# Patient Record
Sex: FEMALE | Race: WHITE | Employment: UNEMPLOYED | URBAN - METROPOLITAN AREA
[De-identification: names, ages, dates, MRNs, and addresses within clinical notes are randomized per-mention and may not be internally consistent; named-entity substitution may affect disease eponyms.]

---

## 2018-09-14 ENCOUNTER — OFFICE VISIT (OUTPATIENT)
Dept: URGENT CARE | Age: 53
End: 2018-09-14

## 2018-09-14 VITALS
TEMPERATURE: 97.7 F | SYSTOLIC BLOOD PRESSURE: 126 MMHG | DIASTOLIC BLOOD PRESSURE: 60 MMHG | HEART RATE: 84 BPM | OXYGEN SATURATION: 97 % | HEIGHT: 67 IN | WEIGHT: 183 LBS | BODY MASS INDEX: 28.72 KG/M2 | RESPIRATION RATE: 16 BRPM

## 2018-09-14 DIAGNOSIS — L02.411 ABSCESS OF AXILLA, RIGHT: ICD-10-CM

## 2018-09-14 DIAGNOSIS — L02.91 ABSCESS: Primary | ICD-10-CM

## 2018-09-14 RX ORDER — NAPROXEN 500 MG/1
500 TABLET ORAL 2 TIMES DAILY WITH MEALS
Qty: 30 TAB | Refills: 0 | Status: SHIPPED | OUTPATIENT
Start: 2018-09-14 | End: 2022-05-06 | Stop reason: CLARIF

## 2018-09-14 RX ORDER — DOXYCYCLINE 100 MG/1
100 TABLET ORAL 2 TIMES DAILY
Qty: 20 TAB | Refills: 0 | Status: SHIPPED | OUTPATIENT
Start: 2018-09-14 | End: 2018-09-24

## 2018-09-14 NOTE — MR AVS SNAPSHOT
[address] [address] 
[phone] Patient: Alex Mercer MRN: [id number] :1965 Visit Information Date & Time Provider Department Dept. Phone Encounter #  
 2018 10:45 AM [address] [facility] [phone] [id number] Upcoming Health Maintenance Date Due Pneumococcal 19-64 Medium Risk (1 of 1 - PPSV23) 1986 DTaP/Tdap/Td series (1 - Tdap) 2015 BREAST CANCER SCRN MAMMOGRAM 2017 FOBT Q 1 YEAR AGE 50-75 2017 PAP AKA CERVICAL CYTOLOGY 2018 Influenza Age 5 to Adult 2018 Allergies as of 2018  Review Complete On: 2018 By: Sanchez Rubio MD  
 No Known Allergies Current Immunizations  Reviewed on 2015 Name Date Td, Adsorbed PF 7/15/2015 Not reviewed this visit You Were Diagnosed With   
  
 Codes Comments Abscess    -  Primary ICD-10-CM: L02.91 
ICD-9-CM: 026. 9 Abscess of axilla, right     ICD-10-CM: L02.411 ICD-9-CM: 377. 3 Vitals BP Pulse Temp Resp Height(growth percentile) Weight(growth percentile) 126/60 84 97.7 °F (36.5 °C) 16 5' 7\" (1.702 m) 183 lb (83 kg) SpO2 BMI OB Status Smoking Status 97% 28.66 kg/m2 Menopause Former Smoker BMI and BSA Data Body Mass Index Body Surface Area  
 28.66 kg/m 2 1.98 m 2 Preferred Pharmacy Pharmacy Name Phone [address] [place] [address] [phone] Your Updated Medication List  
  
   
This list is accurate as of 18 12:07 PM.  Always use your most recent med list.  
  
  
  
  
 B COMPLEX 1 tablet Generic drug:  b complex vitamins Take 1 Tab by mouth daily. doxycycline 100 mg tablet Commonly known as:  ADOXA Take 1 Tab by mouth two (2) times a day for 10 days. multivitamin tablet Commonly known as:  ONE A DAY  
 Take 1 Tab by mouth daily. naproxen 500 mg tablet Commonly known as:  NAPROSYN Take 1 Tab by mouth two (2) times daily (with meals). Prescriptions Sent to Pharmacy Refills  
 naproxen (NAPROSYN) 500 mg tablet 0 Sig: Take 1 Tab by mouth two (2) times daily (with meals). Class: Normal  
 Pharmacy: [facility] [address], [place] Ph #: [phone] Route: Oral  
 doxycycline (ADOXA) 100 mg tablet 0 Sig: Take 1 Tab by mouth two (2) times a day for 10 days. Class: Normal  
 Pharmacy: [facility] [address], [place] Ph #: [phone] Route: Oral  
  
We Performed the Following I&D ABCESS COMP/MULTIPLE [id number] Custom] Comments: This order was created via procedure documentation Patient Instructions Skin Abscess: Care Instructions Your Care Instructions A skin abscess is a bacterial infection that forms a pocket of pus. A boil is a kind of skin abscess. The doctor may have cut an opening in the abscess so that the pus can drain out. You may have gauze in the cut so that the abscess will stay open and keep draining. You may need antibiotics. You will need to follow up with your doctor to make sure the infection has gone away. The doctor has checked you carefully, but problems can develop later. If you notice any problems or new symptoms, get medical treatment right away. Follow-up care is a key part of your treatment and safety. Be sure to make and go to all appointments, and call your doctor if you are having problems. It's also a good idea to know your test results and keep a list of the medicines you take. How can you care for yourself at home? · Apply warm and dry compresses, a heating pad set on low, or a hot water bottle 3 or 4 times a day for pain. Keep a cloth between the heat source and your skin. · If your doctor prescribed antibiotics, take them as directed. Do not stop taking them just because you feel better. You need to take the full course of antibiotics. · Take pain medicines exactly as directed. ¨ If the doctor gave you a prescription medicine for pain, take it as prescribed. ¨ If you are not taking a prescription pain medicine, ask your doctor if you can take an over-the-counter medicine. · Keep your bandage clean and dry. Change the bandage whenever it gets wet or dirty, or at least one time a day. · If the abscess was packed with gauze: 
¨ Keep follow-up appointments to have the gauze changed or removed. If the doctor instructed you to remove the gauze, gently pull out all of the gauze when your doctor tells you to. ¨ After the gauze is removed, soak the area in warm water for 15 to 20 minutes 2 times a day, until the wound closes. When should you call for help? Call your doctor now or seek immediate medical care if: 
  · You have signs of worsening infection, such as: 
¨ Increased pain, swelling, warmth, or redness. ¨ Red streaks leading from the infected skin. ¨ Pus draining from the wound. ¨ A fever.  
 Watch closely for changes in your health, and be sure to contact your doctor if: 
  · You do not get better as expected. Where can you learn more? Go to http://rosaline-jaime.info/. Enter H009 in the search box to learn more about \"Skin Abscess: Care Instructions. \" Current as of: May 10, 2017 Content Version: 11.7 © 2652-7044 [employer]wise[employer]. Care instructions adapted under license by [employer] (which disclaims liability or warranty for this information). If you have questions about a medical condition or this instruction, always ask your healthcare professional. [place] [address] any warranty or liability for your use of this information. Hidradenitis Suppurativa: Care Instructions Your Care Instructions Hidradenitis suppurativa (say \"sgt-kmyg-wm-NY-tus sup-yur-uh-TY-vuh\") is a skin condition that causes lumps on the skin that look like pimples or boils. The lumps are usually painful and can break open and drain blood and bad-smelling pus. The condition can come and go for many years. Treatment for this condition may includeantibiotics and other medicines. You may need surgeryto remove the lumps. Home care includes wearing loose-fitting clothes and washing the area gently. You can help prevent lumps from coming back by staying at a healthy weight and not smoking. Doctors don't know exactly how this condition starts. But they do know that something irritates and inflames the hair follicles, causing them to swell and form lumps. This skin condition can't be spread from person to person (isn't contagious). Follow-up care is a key part of your treatment and safety. Be sure to make and go to all appointments, and call your doctor if you are having problems. It's also a good idea to know your test results and keep a list of the medicines you take. How can you care for yourself at home? 
[facility] care 
  · Wash the area every day with mild soap. Use your hands rather than a washcloth or sponge when you wash that part of your body.  
  · Leave the affected areas uncovered when you can. If you have lumps that are draining, you can cover them with a bandage or other dressing. Put petroleum jelly (such as Vaseline) on the dressing to help keep it from sticking.  
  · Wear-loose fitting clothes that don't rub against the area. Avoid activities that cause skin to rub together.  
  · If you have pain, try a warm compress. Soak a towel or washcloth in warm water, wring it out, and place it on the affected skin for about 10 minutes. Medicines 
  · Be safe with medicines. Take your medicines exactly as prescribed. Call your doctor if you think you are having a problem with your medicine. You will get more details on the specific medicines your doctor prescribes.  
  · If your doctor prescribed antibiotics, take them as directed. Do not stop taking them just because you feel better. You need to take the full course of antibiotics.  
 Lifestyle choices 
  · If you smoke, think about quitting. Smoking can make the condition worse. If you need help quitting, talk to your doctor about stop-smoking programs and medicines. These can increase your chances of quitting for good.  
  · Stay at a healthy weight, or lose weight, by eating healthy foods and being physically active. Being overweight could make this condition worse. When should you call for help? Call your doctor now or seek immediate medical care if: 
  · You have symptoms of infection, such as: 
¨ Increased pain, swelling, warmth, or redness. ¨ Red streaks leading from the area. ¨ Pus draining from the area. ¨ A fever.  
 Watch closely for changes in your health, and be sure to contact your doctor if: 
  · You do not get better as expected. Where can you learn more? Go to http://rosaline-jaime.info/. Enter D983 in the search box to learn more about \"Hidradenitis Suppurativa: Care Instructions. \" Current as of: October 5, 2017 Content Version: 11.7 © 6684-3236 [employer]. Care instructions adapted under license by [facility] (which disclaims liability or warranty for this information). If you have questions about a medical condition or this instruction, always ask your healthcare professional. [place] [address] any warranty or liability for your use of this information. Keep packing in place, remove in 3 days, warms compresses daily, follow up pcp in 3 days, antibiotics as prescribed Introducing [facility]! Dear Day Serna: 
Thank you for requesting a [employer] account. Our records indicate that you already have an active [employer] account.   You can access your account anytime at [email or web address] Did you know that you can access your hospital and ER discharge instructions at any time in [email or web address]? You can also review all of your test results from your hospital stay or ER visit. Additional Information If you have questions, please visit the Frequently Asked Questions section of the [email or web address] website at [email or web address] Remember, [email or web address] is NOT to be used for urgent needs. For medical emergencies, dial 911. Now available from your iPhone and Android! Please provide this summary of care documentation to your next provider. Your primary care clinician is listed as Fabi Grant. If you have any questions after today's visit, please call [phone].

## 2018-09-14 NOTE — PROGRESS NOTES
HPI Comments: History of abscess right axilla, incised and drained 5 years ago, increasing pain, redness, drainage over last week Patient is a 50 y.o. female presenting with abscess. The history is provided by the patient. Abscess This is a recurrent problem. The current episode started more than 1 week ago. The problem occurs constantly. The problem has not changed since onset. Pertinent negatives include no chest pain, no abdominal pain, no headaches and no shortness of breath. Nothing aggravates the symptoms. Relieved by: hot compresses. She has tried a warm compress for the symptoms. The treatment provided mild relief. Past Medical History:  
Diagnosis Date  Alcohol abuse  Boil 9/11/2014  
 right axilla  Seizures ([address] 5/2015  
 2 x Past Surgical History:  
Procedure Laterality Date  HX CYST INCISION AND DRAINAGE Right 9/2014  
 complex I&D  HX ORTHOPAEDIC    
 right wrist  
   
 
Family History Problem Relation Age of Onset  COPD Father  Hypertension Mother Social History Social History  Marital status:  Spouse name: N/A  
 Number of children: N/A  
 Years of education: N/A Occupational History  Not on file. Social History Main Topics  Smoking status: Former Smoker Packs/day: 0.50  Smokeless tobacco: Current User  Alcohol use Yes Comment: at least a 5th a day. States not drinking for the 1 year  Drug use: No  
 Sexual activity: Yes  
  Partners: Male Birth control/ protection: None Other Topics Concern  Not on file Social History Narrative [employer] Lives in [place], travels with  and his job- on contract, previous [employer] 2 adult sons, 24 and 24 yo. ALLERGIES: Review of patient's allergies indicates no known allergies. Review of Systems Constitutional: Negative for fever. HENT: Negative for rhinorrhea. Respiratory: Negative for shortness of breath. Cardiovascular: Negative for chest pain. Gastrointestinal: Negative for abdominal pain, nausea and vomiting. Musculoskeletal: Negative for myalgias. Skin: Positive for wound. Neurological: Negative for headaches. All other systems reviewed and are negative. Vitals:  
 09/14/18 1106 BP: 126/60 Pulse: 84 Resp: 16 Temp: 97.7 °F (36.5 °C) SpO2: 97% Weight: 183 lb (83 kg) Height: 5' 7\" (1.702 m) Physical Exam  
Constitutional: She is oriented to person, place, and time. She appears well-developed and well-nourished. No distress. HENT:  
Head: Normocephalic and atraumatic. Eyes: Conjunctivae and EOM are normal. Pupils are equal, round, and reactive to light. Cardiovascular: Intact distal pulses. Musculoskeletal: Normal range of motion. She exhibits no edema, tenderness or deformity. Neurological: She is alert and oriented to person, place, and time. No cranial nerve deficit. She exhibits normal muscle tone. Skin: Skin is warm and dry. She is not diaphoretic. There is erythema. Right axilla approx 4 cm ellipitical fluctuant lesion with surrounding erythema, small pustule in center with tenderness to palpation, Psychiatric: She has a normal mood and affect. Her behavior is normal. Judgment and thought content normal.  
Nursing note and vitals reviewed. MDM Differential Diagnosis; Clinical Impression; Plan: Abscess right axilla I&D Abcess Complex Consent: Verbal consent obtained. Written consent not obtained. Consent given by: patient Date/Time: 9/14/2018 12:02 PM 
Performed by: attendingPreparation: skin prepped with Betadine Pre-procedure re-eval: Immediately prior to the procedure, the patient was reevaluated and found suitable for the planned procedure and any planned medications.  
Time out: Immediately prior to the procedure a time out was called to verify the correct patient, procedure, equipment, staff and marking as appropriate. Shelia Harding Location details: right [place] Anesthesia: local infiltration Anesthesia: 
Local Anesthetic: lidocaine 1% with epinephrine Anesthetic total: 3 mL Scalpel size: 11 Incision type: single straight Complexity: simple Drainage: purulent Drainage amount: moderate Wound treatment: drain placed and  expression of material 
Packing material: 1/4 in iodoform gauze Post-procedure: dressing applied Patient tolerance: Patient tolerated the procedure well with no immediate complications My total time at bedside, performing this procedure was 1-15 minutes.

## 2018-09-14 NOTE — PATIENT INSTRUCTIONS
Skin Abscess: Care Instructions Your Care Instructions A skin abscess is a bacterial infection that forms a pocket of pus. A boil is a kind of skin abscess. The doctor may have cut an opening in the abscess so that the pus can drain out. You may have gauze in the cut so that the abscess will stay open and keep draining. You may need antibiotics. You will need to follow up with your doctor to make sure the infection has gone away. The doctor has checked you carefully, but problems can develop later. If you notice any problems or new symptoms, get medical treatment right away. Follow-up care is a key part of your treatment and safety. Be sure to make and go to all appointments, and call your doctor if you are having problems. It's also a good idea to know your test results and keep a list of the medicines you take. How can you care for yourself at home? · Apply warm and dry compresses, a heating pad set on low, or a hot water bottle 3 or 4 times a day for pain. Keep a cloth between the heat source and your skin. · If your doctor prescribed antibiotics, take them as directed. Do not stop taking them just because you feel better. You need to take the full course of antibiotics. · Take pain medicines exactly as directed. ¨ If the doctor gave you a prescription medicine for pain, take it as prescribed. ¨ If you are not taking a prescription pain medicine, ask your doctor if you can take an over-the-counter medicine. · Keep your bandage clean and dry. Change the bandage whenever it gets wet or dirty, or at least one time a day. · If the abscess was packed with gauze: 
¨ Keep follow-up appointments to have the gauze changed or removed. If the doctor instructed you to remove the gauze, gently pull out all of the gauze when your doctor tells you to. ¨ After the gauze is removed, soak the area in warm water for 15 to 20 minutes 2 times a day, until the wound closes. When should you call for help? Call your doctor now or seek immediate medical care if: 
  · You have signs of worsening infection, such as: 
¨ Increased pain, swelling, warmth, or redness. ¨ Red streaks leading from the infected skin. ¨ Pus draining from the wound. ¨ A fever.  
 Watch closely for changes in your health, and be sure to contact your doctor if: 
  · You do not get better as expected. Where can you learn more? Go to http://rosaline-jaime.info/. Enter U502 in the search box to learn more about \"Skin Abscess: Care Instructions. \" Current as of: May 10, 2017 Content Version: 11.7 © 4964-0857 [employer]. Care instructions adapted under license by [employer] (which disclaims liability or warranty for this information). If you have questions about a medical condition or this instruction, always ask your healthcare professional. [address] any warranty or liability for your use of this information. Hidradenitis Suppurativa: Care Instructions Your Care Instructions Hidradenitis suppurativa (say \"zpp-vohs-jo-NY-tus sup-yur-uh-TY-vuh\") is a skin condition that causes lumps on the skin that look like pimples or boils. The lumps are usually painful and can break open and drain blood and bad-smelling pus. The condition can come and go for many years. Treatment for this condition may includeantibiotics and other medicines. You may need surgeryto remove the lumps. Home care includes wearing loose-fitting clothes and washing the area gently. You can help prevent lumps from coming back by staying at a healthy weight and not smoking. Doctors don't know exactly how this condition starts. But they do know that something irritates and inflames the hair follicles, causing them to swell and form lumps. This skin condition can't be spread from person to person (isn't contagious). Follow-up care is a key part of your treatment and safety.  Be sure to make and go to all appointments, and call your doctor if you are having problems. It's also a good idea to know your test results and keep a list of the medicines you take. How can you care for yourself at home? 
[facility] care 
  · Wash the area every day with mild soap. Use your hands rather than a washcloth or sponge when you wash that part of your body.  
  · Leave the affected areas uncovered when you can. If you have lumps that are draining, you can cover them with a bandage or other dressing. Put petroleum jelly (such as Vaseline) on the dressing to help keep it from sticking.  
  · Wear-loose fitting clothes that don't rub against the area. Avoid activities that cause skin to rub together.  
  · If you have pain, try a warm compress. Soak a towel or washcloth in warm water, wring it out, and place it on the affected skin for about 10 minutes. Medicines 
  · Be safe with medicines. Take your medicines exactly as prescribed. Call your doctor if you think you are having a problem with your medicine. You will get more details on the specific medicines your doctor prescribes.  
  · If your doctor prescribed antibiotics, take them as directed. Do not stop taking them just because you feel better. You need to take the full course of antibiotics.  
 Lifestyle choices 
  · If you smoke, think about quitting. Smoking can make the condition worse. If you need help quitting, talk to your doctor about stop-smoking programs and medicines. These can increase your chances of quitting for good.  
  · Stay at a healthy weight, or lose weight, by eating healthy foods and being physically active. Being overweight could make this condition worse. When should you call for help? Call your doctor now or seek immediate medical care if: 
  · You have symptoms of infection, such as: 
¨ Increased pain, swelling, warmth, or redness. ¨ Red streaks leading from the area. ¨ Pus draining from the area. ¨ A fever.  Watch closely for changes in your health, and be sure to contact your doctor if: 
  · You do not get better as expected. Where can you learn more? Go to http://rosaline-jaime.info/. Enter N433 in the search box to learn more about \"Hidradenitis Suppurativa: Care Instructions. \" Current as of: October 5, 2017 Content Version: 11.7 © 3275-9659 [employer]. Care instructions adapted under license by [employer] (which disclaims liability or warranty for this information). If you have questions about a medical condition or this instruction, always ask your healthcare professional. [address] any warranty or liability for your use of this information. Keep packing in place, remove in 3 days, warms compresses daily, follow up pcp in 3 days, antibiotics as prescribed

## 2018-09-21 ENCOUNTER — OFFICE VISIT (OUTPATIENT)
Dept: URGENT CARE | Age: 53
End: 2018-09-21

## 2018-09-21 VITALS
WEIGHT: 188.5 LBS | DIASTOLIC BLOOD PRESSURE: 60 MMHG | HEART RATE: 62 BPM | TEMPERATURE: 97.7 F | SYSTOLIC BLOOD PRESSURE: 138 MMHG | RESPIRATION RATE: 16 BRPM | BODY MASS INDEX: 29.58 KG/M2 | OXYGEN SATURATION: 98 % | HEIGHT: 67 IN

## 2018-09-21 DIAGNOSIS — L73.2 HIDRADENITIS SUPPURATIVA OF RIGHT AXILLA: Primary | ICD-10-CM

## 2018-09-21 NOTE — PATIENT INSTRUCTIONS
Hidradenitis Suppurativa: Care Instructions Your Care Instructions Hidradenitis suppurativa (say \"kag-jxea-gz-NY-tus sup-ronak-uh-TY-vuh\") is a skin condition that causes lumps on the skin that look like pimples or boils. The lumps are usually painful and can break open and drain blood and bad-smelling pus. The condition can come and go for many years. Treatment for this condition may includeantibiotics and other medicines. You may need surgeryto remove the lumps. Home care includes wearing loose-fitting clothes and washing the area gently. You can help prevent lumps from coming back by staying at a healthy weight and not smoking. Doctors don't know exactly how this condition starts. But they do know that something irritates and inflames the hair follicles, causing them to swell and form lumps. This skin condition can't be spread from person to person (isn't contagious). Follow-up care is a key part of your treatment and safety. Be sure to make and go to all appointments, and call your doctor if you are having problems. It's also a good idea to know your test results and keep a list of the medicines you take. How can you care for yourself at home? 
[facility] care 
  · Wash the area every day with mild soap. Use your hands rather than a washcloth or sponge when you wash that part of your body.  
  · Leave the affected areas uncovered when you can. If you have lumps that are draining, you can cover them with a bandage or other dressing. Put petroleum jelly (such as Vaseline) on the dressing to help keep it from sticking.  
  · Wear-loose fitting clothes that don't rub against the area. Avoid activities that cause skin to rub together.  
  · If you have pain, try a warm compress. Soak a towel or washcloth in warm water, wring it out, and place it on the affected skin for about 10 minutes. Medicines 
  · Be safe with medicines. Take your medicines exactly as prescribed. Call your doctor if you think you are having a problem with your medicine. You will get more details on the specific medicines your doctor prescribes.  
  · If your doctor prescribed antibiotics, take them as directed. Do not stop taking them just because you feel better. You need to take the full course of antibiotics.  
 Lifestyle choices 
  · If you smoke, think about quitting. Smoking can make the condition worse. If you need help quitting, talk to your doctor about stop-smoking programs and medicines. These can increase your chances of quitting for good.  
  · Stay at a healthy weight, or lose weight, by eating healthy foods and being physically active. Being overweight could make this condition worse. When should you call for help? Call your doctor now or seek immediate medical care if: 
  · You have symptoms of infection, such as: 
¨ Increased pain, swelling, warmth, or redness. ¨ Red streaks leading from the area. ¨ Pus draining from the area. ¨ A fever.  
 Watch closely for changes in your health, and be sure to contact your doctor if: 
  · You do not get better as expected. Where can you learn more? Go to http://rosaline-jaime.info/. Enter R463 in the search box to learn more about \"Hidradenitis Suppurativa: Care Instructions. \" Current as of: October 5, 2017 Content Version: 11.7 © 3064-6294 [employer]. Care instructions adapted under license by [employer] (which disclaims liability or warranty for this information). If you have questions about a medical condition or this instruction, always ask your healthcare professional. [place] [address] any warranty or liability for your use of this information.

## 2018-09-21 NOTE — MR AVS SNAPSHOT
[address] [place] [address] 
[phone] Patient: Janelle Carrillo MRN: [id number] :1965 Visit Information Date & Time Provider Department Dept. Phone Encounter #  
 2018 11:45 AM [address] [facility] [phone] [id number] Follow-up Instructions Return in about 1 week (around 2018), or if symptoms worsen or fail to improve. Upcoming Health Maintenance Date Due Pneumococcal 19-64 Medium Risk (1 of 1 - PPSV23) 1986 DTaP/Tdap/Td series (1 - Tdap) 2015 BREAST CANCER SCRN MAMMOGRAM 2017 FOBT Q 1 YEAR AGE 50-75 2017 PAP AKA CERVICAL CYTOLOGY 2018 Influenza Age 5 to Adult 2018 Allergies as of 2018  Review Complete On: 2018 By: Alber Almazan RN No Known Allergies Current Immunizations  Reviewed on 2015 Name Date Td, Adsorbed PF 7/15/2015 Not reviewed this visit You Were Diagnosed With   
  
 Codes Comments Hidradenitis suppurativa of right axilla    -  Primary ICD-10-CM: L73.2 ICD-9-CM: 705.83 Vitals BP Pulse Temp Resp Height(growth percentile) Weight(growth percentile)  
 138/60 62 97.7 °F (36.5 °C) 16 5' 7\" (1.702 m) 188 lb 8 oz (85.5 kg) SpO2 BMI OB Status Smoking Status 98% 29.52 kg/m2 Menopause Former Smoker BMI and BSA Data Body Mass Index Body Surface Area  
 29.52 kg/m 2 2.01 m 2 Preferred Pharmacy Pharmacy Name Phone [address] [place] [address] [phone] Your Updated Medication List  
  
   
This list is accurate as of 18 12:57 PM.  Always use your most recent med list.  
  
  
  
  
 B COMPLEX 1 tablet Generic drug:  b complex vitamins Take 1 Tab by mouth daily. doxycycline 100 mg tablet Commonly known as:  ADOXA Take 1 Tab by mouth two (2) times a day for 10 days. multivitamin tablet Commonly known as:  ONE A DAY Take 1 Tab by mouth daily. naproxen 500 mg tablet Commonly known as:  NAPROSYN Take 1 Tab by mouth two (2) times daily (with meals). Follow-up Instructions Return in about 1 week (around 9/28/2018), or if symptoms worsen or fail to improve. Patient Instructions Hidradenitis Suppurativa: Care Instructions Your Care Instructions Hidradenitis suppurativa (say \"rqq-vtvf-ly-NY-tus sup-yur-uh-TY-vuh\") is a skin condition that causes lumps on the skin that look like pimples or boils. The lumps are usually painful and can break open and drain blood and bad-smelling pus. The condition can come and go for many years. Treatment for this condition may includeantibiotics and other medicines. You may need surgeryto remove the lumps. Home care includes wearing loose-fitting clothes and washing the area gently. You can help prevent lumps from coming back by staying at a healthy weight and not smoking. Doctors don't know exactly how this condition starts. But they do know that something irritates and inflames the hair follicles, causing them to swell and form lumps. This skin condition can't be spread from person to person (isn't contagious). Follow-up care is a key part of your treatment and safety. Be sure to make and go to all appointments, and call your doctor if you are having problems. It's also a good idea to know your test results and keep a list of the medicines you take. How can you care for yourself at home? 
[facility] care 
  · Wash the area every day with mild soap. Use your hands rather than a washcloth or sponge when you wash that part of your body.  
  · Leave the affected areas uncovered when you can. If you have lumps that are draining, you can cover them with a bandage or other dressing.  Put petroleum jelly (such as Vaseline) on the dressing to help keep it from sticking.  
  · Wear-loose fitting clothes that don't rub against the area. Avoid activities that cause skin to rub together.  
  · If you have pain, try a warm compress. Soak a towel or washcloth in warm water, wring it out, and place it on the affected skin for about 10 minutes. Medicines 
  · Be safe with medicines. Take your medicines exactly as prescribed. Call your doctor if you think you are having a problem with your medicine. You will get more details on the specific medicines your doctor prescribes.  
  · If your doctor prescribed antibiotics, take them as directed. Do not stop taking them just because you feel better. You need to take the full course of antibiotics.  
 Lifestyle choices 
  · If you smoke, think about quitting. Smoking can make the condition worse. If you need help quitting, talk to your doctor about stop-smoking programs and medicines. These can increase your chances of quitting for good.  
  · Stay at a healthy weight, or lose weight, by eating healthy foods and being physically active. Being overweight could make this condition worse. When should you call for help? Call your doctor now or seek immediate medical care if: 
  · You have symptoms of infection, such as: 
¨ Increased pain, swelling, warmth, or redness. ¨ Red streaks leading from the area. ¨ Pus draining from the area. ¨ A fever.  
 Watch closely for changes in your health, and be sure to contact your doctor if: 
  · You do not get better as expected. Where can you learn more? Go to http://rosaline-jaime.info/. Enter G473 in the search box to learn more about \"Hidradenitis Suppurativa: Care Instructions. \" Current as of: October 5, 2017 Content Version: 11.7 © 9336-4540 [employer]. Care instructions adapted under license by [employer] (which disclaims liability or warranty for this information).  If you have questions about a medical condition or this instruction, always ask your healthcare professional. [address] any warranty or liability for your use of this information. Introducing [facility]! Dear Marivel Godfrey: 
Thank you for requesting a [employer] account. Our records indicate that you already have an active [employer] account. You can access your account anytime at [email or web address]. [employer] Did you know that you can access your hospital and ER discharge instructions at any time in [employer]? You can also review all of your test results from your hospital stay or ER visit. Additional Information If you have questions, please visit the Frequently Asked Questions section of the [employer] website at [email or web address]. [employer]/. Remember, [employer] is NOT to be used for urgent needs. For medical emergencies, dial 911. Now available from your iPhone and Android! Please provide this summary of care documentation to your next provider. Your primary care clinician is listed as [employer] If you have any questions after today's visit, please call [phone].

## 2018-09-21 NOTE — PROGRESS NOTES
Patient is a 50 y.o. female presenting with wound check. The history is provided by the patient (Return visit for Hidradentitis Supprativa of right axilla. Pt. worried about drainage. taking doxycycline, done on Sun. No redness, streaking, or bleeding. ). Wound Check This is a chronic problem. The current episode started more than 1 week ago. The problem has not changed since onset. Pertinent negatives include no chest pain and no abdominal pain. The symptoms are aggravated by walking. The symptoms are relieved by rest. She has tried a warm compress for the symptoms. The treatment provided no relief. Past Medical History:  
Diagnosis Date  Alcohol abuse  Boil 9/11/2014  
 right axilla  Seizures ([address] 5/2015  
 2 x Past Surgical History:  
Procedure Laterality Date  HX CYST INCISION AND DRAINAGE Right 9/2014  
 complex I&D  HX ORTHOPAEDIC    
 right wrist  
   
 
Family History Problem Relation Age of Onset  COPD Father  Hypertension Mother Social History Social History  Marital status:  Spouse name: N/A  
 Number of children: N/A  
 Years of education: N/A Occupational History  Not on file. Social History Main Topics  Smoking status: Former Smoker Packs/day: 0.50  Smokeless tobacco: Current User  Alcohol use Yes Comment: at least a 5th a day. States not drinking for the 1 year  Drug use: No  
 Sexual activity: Yes  
  Partners: Male Birth control/ protection: None Other Topics Concern  Not on file Social History Narrative Temple Lives in [place], travels with  and his job- on contract, previous [facility] 2 adult sons, 24 and 24 yo. ALLERGIES: Review of patient's allergies indicates no known allergies. Review of Systems Constitutional: Negative for appetite change and chills. HENT: Negative for hearing loss. Cardiovascular: Negative for chest pain. Gastrointestinal: Negative for abdominal pain. Neurological: Negative for weakness, light-headedness and numbness. Psychiatric/Behavioral: Negative for agitation. All other systems reviewed and are negative. Vitals:  
 09/21/18 1159 BP: 138/60 Pulse: 62 Resp: 16 Temp: 97.7 °F (36.5 °C) SpO2: 98% Weight: 188 lb 8 oz (85.5 kg) Height: 5' 7\" (1.702 m) Physical Exam  
Constitutional: She is oriented to person, place, and time. She appears well-developed and well-nourished. No distress. HENT:  
Head: Normocephalic and atraumatic. Right Ear: External ear normal.  
Left Ear: External ear normal.  
Eyes: Conjunctivae and EOM are normal. Pupils are equal, round, and reactive to light. Neck: Normal range of motion. Neck supple. No tracheal deviation present. Cardiovascular: Normal rate, regular rhythm and normal heart sounds. Exam reveals no gallop. No murmur heard. Pulmonary/Chest: Effort normal and breath sounds normal. She has no wheezes. Abdominal: Soft. Bowel sounds are normal. She exhibits no distension. There is no tenderness. Musculoskeletal: Normal range of motion. She exhibits no edema or tenderness. Neurological: She is alert and oriented to person, place, and time. Skin: Skin is warm and dry. She is not diaphoretic. 1 cm linear incision in distal axilla, with purulent drainage and fluctuance. No erythema, streaking, or bleeding at sight. Psychiatric: She has a normal mood and affect. Her behavior is normal. Judgment and thought content normal.  
Nursing note and vitals reviewed. MDM Differential Diagnosis; Clinical Impression; Plan:  
  Hidradenitis Supprativa of right axilla- continue current regimen, add packing gauze, and bacitracin to skin, stop shaving axilla, f/u with Gen Surg if not better in 1 week.  
Risk of Significant Complications, Morbidity, and/or Mortality:  
Presenting problems:  Minimal 
Diagnostic procedures:  Minimal 
 Management options:  Minimal 
Progress:  
Patient progress:  Stable I&D Abcess Simple Consent: Verbal consent obtained. Consent given by: patient Date/Time: 9/21/2018 1:09 PM 
Performed by: [id number] provider: Dr. Arely Parra Procedure prep: No new incision performed, just explored with a sterile q-tip, no loculations, no bleeding. Location details: right [place] Patient tolerance: Patient tolerated the procedure well with no immediate complications My total time at bedside, performing this procedure was 1-15 minutes.

## 2022-05-09 ENCOUNTER — HOSPITAL ENCOUNTER (OUTPATIENT)
Age: 57
Setting detail: OUTPATIENT SURGERY
Discharge: HOME | End: 2022-05-09
Attending: INTERNAL MEDICINE | Admitting: INTERNAL MEDICINE
Payer: COMMERCIAL

## 2022-05-09 ENCOUNTER — ANESTHESIA (OUTPATIENT)
Dept: ENDOSCOPY | Age: 57
End: 2022-05-09
Payer: COMMERCIAL

## 2022-05-09 ENCOUNTER — ANESTHESIA EVENT (OUTPATIENT)
Dept: ENDOSCOPY | Age: 57
End: 2022-05-09
Payer: COMMERCIAL

## 2022-05-09 VITALS
TEMPERATURE: 98.2 F | DIASTOLIC BLOOD PRESSURE: 68 MMHG | HEART RATE: 59 BPM | BODY MASS INDEX: 30.13 KG/M2 | OXYGEN SATURATION: 98 % | WEIGHT: 192 LBS | SYSTOLIC BLOOD PRESSURE: 105 MMHG | HEIGHT: 67 IN | RESPIRATION RATE: 13 BRPM

## 2022-05-09 PROCEDURE — 76060000031 HC ANESTHESIA FIRST 0.5 HR: Performed by: INTERNAL MEDICINE

## 2022-05-09 PROCEDURE — 2709999900 HC NON-CHARGEABLE SUPPLY: Performed by: INTERNAL MEDICINE

## 2022-05-09 PROCEDURE — 77030013992 HC SNR POLYP ENDOSC BSC -B: Performed by: INTERNAL MEDICINE

## 2022-05-09 PROCEDURE — 76040000019: Performed by: INTERNAL MEDICINE

## 2022-05-09 PROCEDURE — 88305 TISSUE EXAM BY PATHOLOGIST: CPT

## 2022-05-09 PROCEDURE — 74011000250 HC RX REV CODE- 250: Performed by: NURSE ANESTHETIST, CERTIFIED REGISTERED

## 2022-05-09 PROCEDURE — 74011250636 HC RX REV CODE- 250/636: Performed by: INTERNAL MEDICINE

## 2022-05-09 PROCEDURE — 74011250636 HC RX REV CODE- 250/636: Performed by: NURSE ANESTHETIST, CERTIFIED REGISTERED

## 2022-05-09 RX ORDER — FLUMAZENIL 0.1 MG/ML
0.2 INJECTION INTRAVENOUS
Status: DISCONTINUED | OUTPATIENT
Start: 2022-05-09 | End: 2022-05-09 | Stop reason: HOSPADM

## 2022-05-09 RX ORDER — LIDOCAINE HYDROCHLORIDE 20 MG/ML
INJECTION, SOLUTION EPIDURAL; INFILTRATION; INTRACAUDAL; PERINEURAL AS NEEDED
Status: DISCONTINUED | OUTPATIENT
Start: 2022-05-09 | End: 2022-05-09 | Stop reason: HOSPADM

## 2022-05-09 RX ORDER — SODIUM CHLORIDE 9 MG/ML
75 INJECTION, SOLUTION INTRAVENOUS CONTINUOUS
Status: DISCONTINUED | OUTPATIENT
Start: 2022-05-09 | End: 2022-05-09 | Stop reason: HOSPADM

## 2022-05-09 RX ORDER — DEXTROMETHORPHAN/PSEUDOEPHED 2.5-7.5/.8
1.2 DROPS ORAL
Status: DISCONTINUED | OUTPATIENT
Start: 2022-05-09 | End: 2022-05-09 | Stop reason: HOSPADM

## 2022-05-09 RX ORDER — MIDAZOLAM HYDROCHLORIDE 1 MG/ML
.25-5 INJECTION, SOLUTION INTRAMUSCULAR; INTRAVENOUS
Status: DISCONTINUED | OUTPATIENT
Start: 2022-05-09 | End: 2022-05-09 | Stop reason: HOSPADM

## 2022-05-09 RX ORDER — PHENYLEPHRINE HCL IN 0.9% NACL 0.4MG/10ML
SYRINGE (ML) INTRAVENOUS AS NEEDED
Status: DISCONTINUED | OUTPATIENT
Start: 2022-05-09 | End: 2022-05-09 | Stop reason: HOSPADM

## 2022-05-09 RX ORDER — NALOXONE HYDROCHLORIDE 0.4 MG/ML
0.4 INJECTION, SOLUTION INTRAMUSCULAR; INTRAVENOUS; SUBCUTANEOUS
Status: DISCONTINUED | OUTPATIENT
Start: 2022-05-09 | End: 2022-05-09 | Stop reason: HOSPADM

## 2022-05-09 RX ORDER — PROPOFOL 10 MG/ML
INJECTION, EMULSION INTRAVENOUS AS NEEDED
Status: DISCONTINUED | OUTPATIENT
Start: 2022-05-09 | End: 2022-05-09 | Stop reason: HOSPADM

## 2022-05-09 RX ORDER — ATROPINE SULFATE 0.1 MG/ML
0.5 INJECTION INTRAVENOUS
Status: DISCONTINUED | OUTPATIENT
Start: 2022-05-09 | End: 2022-05-09 | Stop reason: HOSPADM

## 2022-05-09 RX ORDER — FENTANYL CITRATE 50 UG/ML
25 INJECTION, SOLUTION INTRAMUSCULAR; INTRAVENOUS
Status: DISCONTINUED | OUTPATIENT
Start: 2022-05-09 | End: 2022-05-09 | Stop reason: HOSPADM

## 2022-05-09 RX ORDER — SODIUM CHLORIDE 0.9 % (FLUSH) 0.9 %
5-40 SYRINGE (ML) INJECTION AS NEEDED
Status: DISCONTINUED | OUTPATIENT
Start: 2022-05-09 | End: 2022-05-09 | Stop reason: HOSPADM

## 2022-05-09 RX ORDER — SODIUM CHLORIDE 0.9 % (FLUSH) 0.9 %
5-40 SYRINGE (ML) INJECTION EVERY 8 HOURS
Status: DISCONTINUED | OUTPATIENT
Start: 2022-05-09 | End: 2022-05-09 | Stop reason: HOSPADM

## 2022-05-09 RX ORDER — EPINEPHRINE 0.1 MG/ML
1 INJECTION INTRACARDIAC; INTRAVENOUS
Status: DISCONTINUED | OUTPATIENT
Start: 2022-05-09 | End: 2022-05-09 | Stop reason: HOSPADM

## 2022-05-09 RX ADMIN — PROPOFOL 90 MG: 10 INJECTION, EMULSION INTRAVENOUS at 08:53

## 2022-05-09 RX ADMIN — Medication 80 MCG: at 08:59

## 2022-05-09 RX ADMIN — PROPOFOL 50 MG: 10 INJECTION, EMULSION INTRAVENOUS at 08:57

## 2022-05-09 RX ADMIN — SODIUM CHLORIDE 75 ML/HR: 9 INJECTION, SOLUTION INTRAVENOUS at 08:46

## 2022-05-09 RX ADMIN — LIDOCAINE HYDROCHLORIDE 40 MG: 20 INJECTION, SOLUTION EPIDURAL; INFILTRATION; INTRACAUDAL; PERINEURAL at 08:53

## 2022-05-09 NOTE — PROGRESS NOTES
Endoscope was pre-cleaned at the bedside immediately following procedure by River MANNING. For medications administered by anesthesia, see anesthesia chart.

## 2022-05-09 NOTE — DISCHARGE INSTRUCTIONS
Nehemiah Swan  [id number]  1965    COLON DISCHARGE INSTRUCTIONS  Discomfort:  Redness at IV site- apply warm compress to area; if redness or soreness persist- contact your physician  There may be a slight amount of blood passed from the rectum  Gaseous discomfort- walking, belching will help relieve any discomfort  You may not operate a vehicle for 12 hours  You may not engage in an occupation involving machinery or appliances for rest of today  You may not drink alcoholic beverages for at least 12 hours  Avoid making any critical decisions for at least 24 hour  DIET:   High fiber diet. - however -  remember your colon is empty and a heavy meal will produce gas. Avoid these foods:  vegetables, fried / greasy foods, carbonated drinks for today  MEDICATION:         ACTIVITY:  You may not resume your normal daily activities until tomorrow AM; it is recommended that you spend the remainder of the day resting -  avoid any strenuous activity. CALL M.D. ANY SIGN OF:   Increasing pain, nausea, vomiting  Abdominal distension (swelling)  New increased bleeding (oral or rectal)  Fever (chills)  Pain in chest area  Bloody discharge from nose or mouth  Shortness of breath    IMPRESSION:  -Single diminutive 2mm sessile polyp in the ascending colon at 80cm; removed with cold snare  -Scattered diverticulosis seen throughout the colon  -Small grade 1 internal hemorrhoids      Follow-up Instructions:  -Call Dr. Carlos Carey for the results of procedure / biopsy in 7-10 days  -Await pathology. ,   -If adenoma is present, repeat colonoscopy in 5 years; otherwise if polyp is hyperplastic, continue colon cancer screening as colonoscopy in 10 years.  -Telephone # [phone]    Jhonathan Rose MD

## 2022-05-09 NOTE — PROGRESS NOTES
Daron Grantyasmin  1965  [id number]    Situation:  Verbal report received from: Mily Molina RN  Procedure: Procedure(s):  COLONOSCOPY  ENDOSCOPIC POLYPECTOMY    Background:    Preoperative diagnosis: Screening  Postoperative diagnosis: Diverticulosis, polyps, hemorrhoids    :  Dr. Fidencio Christy  Assistant(s): Endoscopy Technician-1: Mark Olvera  Endoscopy RN-1: Rajiv Rash    Specimens:   ID Type Source Tests Collected by Time Destination   1 : Ascending Polyps Preservative Colon, Ascending  Radha Young MD 5/9/2022 1284 Pathology     H. Pylori  no    Assessment:  Intra-procedure medications     Anesthesia gave intra-procedure sedation and medications, see anesthesia flow sheet yes    Intravenous fluids: NS@ KVO     Vital signs stable yes    Abdominal assessment: round and soft     Recommendation:  Discharge patient per MD order yes  Return to floor n/a  Family or Geo Soliz,   Permission to share finding with family or friend yes

## 2022-05-09 NOTE — PROCEDURES
NAME:  Daron Lynch   :   1965   MRN:   [id number]     Date/Time:  2022 9:08 AM    Colonoscopy Operative Report    Procedure Type:   Colonoscopy with polypectomy (cold snare)     Indications:     Screening colonoscopy  Pre-operative Diagnosis: see indication above  Post-operative Diagnosis:  See findings below  :  Nehemiah Oscar MD  Referring Provider: Liza Opitz, MD; -Vishal Puckett MD    Exam:  Airway: clear, no airway problems anticipated  Heart: RRR, without gallops or rubs  Lungs: clear bilaterally without wheezes, crackles, or rhonchi  Abdomen: soft, nontender, nondistended, bowel sounds present  Mental Status: awake, alert and oriented to person, place and time    Sedation:  MAC anesthesia Propofol 140mg IV  Procedure Details:  After informed consent was obtained with all risks and benefits of procedure explained and preoperative exam completed, the patient was taken to the endoscopy suite and placed in the left lateral decubitus position. Upon sequential sedation as per above, a digital rectal exam was performed demonstrating internal hemorrhoids. The Olympus videocolonoscope  was inserted in the rectum and carefully advanced to the cecum, which was identified by the ileocecal valve and appendiceal orifice. The quality of preparation was adequate. The colonoscope was slowly withdrawn with careful evaluation between folds. Retroflexion in the rectum was completed demonstrating internal hemorrhoids. Findings:     -Single diminutive 2mm sessile polyp in the ascending colon at 80cm; removed with cold snare  -Scattered diverticulosis seen throughout the colon  -Small grade 1 internal hemorrhoids    Specimen Removed:  #1 asc colon polyp  Complications: None. EBL:  None.     Impression:    -Single diminutive 2mm sessile polyp in the ascending colon at 80cm; removed with cold snare  -Scattered diverticulosis seen throughout the colon  -Small grade 1 internal hemorrhoids    Recommendations: --Await pathology. , -If adenoma is present, repeat colonoscopy in 5 years; otherwise if polyp is hyperplastic, continue colon cancer screening as colonoscopy in 10 years. High fiber diet. Resume normal medication(s). You will receive a letter about the biopsy results in about 10 days. You may be asked to call your doctor's office for the results. Discharge Disposition:  Home in the company of a  when able to ambulate.       Dorian Serna MD

## 2022-05-09 NOTE — ANESTHESIA POSTPROCEDURE EVALUATION
Procedure(s):  COLONOSCOPY  ENDOSCOPIC POLYPECTOMY. total IV anesthesia    Anesthesia Post Evaluation        Patient location during evaluation: PACU  Note status: Adequate. Level of consciousness: responsive to verbal stimuli and sleepy but conscious  Pain management: satisfactory to patient  Airway patency: patent  Anesthetic complications: no  Cardiovascular status: acceptable  Respiratory status: acceptable  Hydration status: acceptable  Comments: +Post-Anesthesia Evaluation and Assessment    Patient: Judith Magallon MRN: [id number]  SSN: [id number]   YOB: 1965  Age: 49 y.o. Sex: female      Cardiovascular Function/Vital Signs    BP (!) 140/78   Pulse 68   Temp 36.7 °C (98 °F)   Resp 10   Ht 5' 7\" (1.702 m)   Wt 87.1 kg (192 lb)   SpO2 100%   Breastfeeding No   BMI 30.07 kg/m²     Patient is status post Procedure(s):  COLONOSCOPY  ENDOSCOPIC POLYPECTOMY. Nausea/Vomiting: Controlled. Postoperative hydration reviewed and adequate. Pain:  Pain Scale 1: Numeric (0 - 10) (05/09/22 0843)  Pain Intensity 1: 0 (05/09/22 0843)   Managed. Neurological Status: At baseline. Mental Status and Level of Consciousness: Arousable. Pulmonary Status:   O2 Device: None (Room air) (05/09/22 0843)   Adequate oxygenation and airway patent. Complications related to anesthesia: None    Post-anesthesia assessment completed. No concerns. Signed By: Reid Kelly MD    5/9/2022  Post anesthesia nausea and vomiting:  controlled      INITIAL Post-op Vital signs: No vitals data found for the desired time range.

## 2022-05-09 NOTE — H&P
Gastroenterology Outpatient History and Physical    Patient: Aditya Mejia    Physician: Adriano Escoto MD    Chief Complaint: CRC screening  History of Present Illness: 50yo F with need for CRC screeing. No fam hx of CRc or polyps. No GI s/sx    History:  Past Medical History:   Diagnosis Date    Alcohol abuse     no alcohol since 2018    Boil 2014    right axilla    Seizures ([address] 2015    2 x      Past Surgical History:   Procedure Laterality Date    HX CYST INCISION AND DRAINAGE Right 2014    complex I&D    HX ORTHOPAEDIC      right wrist, rods in rt hand and wrist      Social History     Socioeconomic History    Marital status:    Tobacco Use    Smoking status: Former Smoker     Packs/day: 0.50     Quit date:      Years since quittin.3    Smokeless tobacco: Current User   Vaping Use    Vaping Use: Some days    Substances: Nicotine (6%n per pt)   Substance and Sexual Activity    Alcohol use: Yes     Comment: at least a 5th a day. States not drinking for the 1 year    Drug use: No    Sexual activity: Yes     Partners: Male     Birth control/protection: None   Social History Narrative    [employer]    Lives in [place], travels with  and his job- on contract, previous [facility]        2 adult sons, 24 and 26 yo. Family History   Problem Relation Age of Onset   [facility] COPD Father     Hypertension Mother       Patient Active Problem List   Diagnosis Code    Humeral surgical neck fracture S42.213A    Seizure ([address] R56.9    Chronic alcoholism ([address] F10.20    Hypokalemia E87.6    Lactic acidosis E87.2    Severe tongue swelling R22.0    Drooling K11.7       Allergies: No Known Allergies  Medications:   Prior to Admission medications    Medication Sig Start Date End Date Taking? Authorizing Provider   multivitamin (ONE A DAY) tablet Take 1 Tab by mouth daily. Yes Provider, Historical     Physical Exam:   Vital Signs: There were no vitals taken for this visit.   General: well developed, well nourished   HEENT: unremarkable   Heart: regular rhythm no mumur    Lungs: clear   Abdominal:  benign   Neurological: unremarkable   Extremities: no edema     Findings/Diagnosis: CRC screening  Plan of Care/Planned Procedure: colonoscopy with conscious/deep sedation    Signed:  Donna Hubbard MD 5/9/2022

## 2022-05-09 NOTE — ANESTHESIA PREPROCEDURE EVALUATION
Anesthetic History   No history of anesthetic complications            Review of Systems / Medical History  Patient summary reviewed, nursing notes reviewed and pertinent labs reviewed    Pulmonary          Smoker      Comments: Former Smoker   Neuro/Psych     seizures         Cardiovascular                  Exercise tolerance: >4 METS     GI/Hepatic/Renal  Within defined limits              Endo/Other  Within defined limits           Other Findings   Comments: Hx ETOH abuse           Physical Exam    Airway  Mallampati: I    Neck ROM: normal range of motion   Mouth opening: Normal     Cardiovascular  Regular rate and rhythm,  S1 and S2 normal,  no murmur, click, rub, or gallop             Dental      Comments: 1 broken tooth   Pulmonary  Breath sounds clear to auscultation               Abdominal  GI exam deferred       Other Findings            Anesthetic Plan    ASA: 2  Anesthesia type: total IV anesthesia          Induction: Intravenous  Anesthetic plan and risks discussed with: Patient

## (undated) DEVICE — Z DISCONTINUED PER MEDLINE LINE GAS SAMPLING O2/CO2 LNG AD 13 FT NSL W/ TBNG FILTERLINE

## (undated) DEVICE — SOLIDIFIER FLD 2OZ 1500CC N DISINF IN BTL DISP SAFESORB

## (undated) DEVICE — TRAP,MUCUS SPECIMEN, 80CC: Brand: MEDLINE

## (undated) DEVICE — CATH IV AUTOGRD BC PNK 20GA 25 -- INSYTE

## (undated) DEVICE — CONTAINER SPEC 20 ML LID NEUT BUFF FORMALIN 10 % POLYPR STS

## (undated) DEVICE — ELECTRODE,RADIOTRANSLUCENT,FOAM,5PK: Brand: MEDLINE

## (undated) DEVICE — SNARE ENDOSCP M L240CM W27MM SHTH DIA2.4MM CHN 2.8MM OVL

## (undated) DEVICE — TOWEL 4 PLY TISS 19X30 SUE WHT

## (undated) DEVICE — HYPODERMIC SAFETY NEEDLE: Brand: MAGELLAN

## (undated) DEVICE — STRAINER URIN CALC RNL MSH -- CONVERT TO ITEM 357634

## (undated) DEVICE — NON-REM POLYHESIVE PATIENT RETURN ELECTRODE: Brand: VALLEYLAB

## (undated) DEVICE — BASIN EMSIS 16OZ GRAPHITE PLAS KID SHP MOLD GRAD FOR ORAL

## (undated) DEVICE — NEONATAL-ADULT SPO2 SENSOR: Brand: NELLCOR

## (undated) DEVICE — Device

## (undated) DEVICE — SYR 10ML LUER LOK 1/5ML GRAD --

## (undated) DEVICE — 1200 GUARD II KIT W/5MM TUBE W/O VAC TUBE: Brand: GUARDIAN

## (undated) DEVICE — SYR 3ML LL TIP 1/10ML GRAD --

## (undated) DEVICE — SET ADMIN 16ML TBNG L100IN 2 Y INJ SITE IV PIGGY BK DISP